# Patient Record
Sex: FEMALE | Race: WHITE | URBAN - METROPOLITAN AREA
[De-identification: names, ages, dates, MRNs, and addresses within clinical notes are randomized per-mention and may not be internally consistent; named-entity substitution may affect disease eponyms.]

---

## 2022-11-22 ENCOUNTER — OFFICE VISIT (OUTPATIENT)
Dept: URBAN - METROPOLITAN AREA CLINIC 44 | Facility: CLINIC | Age: 87
End: 2022-11-22
Payer: MEDICARE

## 2022-11-22 DIAGNOSIS — H25.813 COMBINED FORMS OF AGE-RELATED CATARACT, BILATERAL: ICD-10-CM

## 2022-11-22 DIAGNOSIS — H17.822 PERIPHERAL OPACITY OF CORNEA OF LEFT EYE: ICD-10-CM

## 2022-11-22 DIAGNOSIS — H35.3131 NONEXUDATIVE MACULAR DEGENERATION, EARLY DRY STAGE, BILATERAL: ICD-10-CM

## 2022-11-22 DIAGNOSIS — H04.123 TEAR FILM INSUFFICIENCY OF BILATERAL LACRIMAL GLANDS: Primary | ICD-10-CM

## 2022-11-22 DIAGNOSIS — H43.812 VITREOUS DEGENERATION, LEFT EYE: ICD-10-CM

## 2022-11-22 PROCEDURE — 99204 OFFICE O/P NEW MOD 45 MIN: CPT | Performed by: OPTOMETRIST

## 2022-11-22 PROCEDURE — 92134 CPTRZ OPH DX IMG PST SGM RTA: CPT | Performed by: OPTOMETRIST

## 2022-11-22 ASSESSMENT — INTRAOCULAR PRESSURE
OS: 16
OD: 16

## 2022-11-22 ASSESSMENT — VISUAL ACUITY
OS: 20/100
OD: 20/25

## 2022-11-22 ASSESSMENT — KERATOMETRY
OS: 45.88
OD: 45.88

## 2022-11-22 NOTE — IMPRESSION/PLAN
Impression: Nonexudative macular degeneration, early dry stage, bilateral: H35.3131. Plan: Mild drusen/RPE changes OU, no SRF OU Non-smoker Does not meet AREDS 2 criteria, but discussed lutein supplement RTC if notice changes in vision

## 2022-11-22 NOTE — IMPRESSION/PLAN
Impression: Combined forms of age-related cataract, bilateral: H25.813. Plan: Discussed cataracts with patient. Discussed treatment options. Surgical treatment is recommended. Surgical risks and benefits discussed. Patient elects surgical treatment. Recommend surgery OU, OS first. standard lens, LenSx, ORA. Aim OD: plano. Aim OS: plano. May need glasses for best vision after surgery.  
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

## 2022-11-22 NOTE — IMPRESSION/PLAN
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 Plan: Mild peripheral haze. May limit VA after cataract surgery.

## 2023-01-26 ENCOUNTER — ADULT PHYSICAL (OUTPATIENT)
Dept: URBAN - METROPOLITAN AREA CLINIC 44 | Facility: CLINIC | Age: 88
End: 2023-01-26
Payer: MEDICARE

## 2023-01-26 DIAGNOSIS — Z01.818 ENCOUNTER FOR OTHER PREPROCEDURAL EXAMINATION: Primary | ICD-10-CM

## 2023-01-26 DIAGNOSIS — H25.813 COMBINED FORMS OF AGE-RELATED CATARACT, BILATERAL: ICD-10-CM

## 2023-01-26 PROCEDURE — 99203 OFFICE O/P NEW LOW 30 MIN: CPT | Performed by: PHYSICIAN ASSISTANT

## 2023-01-26 PROCEDURE — 92025 CPTRIZED CORNEAL TOPOGRAPHY: CPT | Performed by: CLINIC/CENTER

## 2023-01-26 RX ORDER — ATENOLOL 100 MG/1
100 MG TABLET ORAL
Qty: 0 | Refills: 0 | Status: ACTIVE
Start: 2023-01-26

## 2023-01-26 ASSESSMENT — PACHYMETRY
OS: 3.93
OD: 22.86
OD: 3.54
OS: 23.56

## 2023-01-30 ENCOUNTER — PRE-OPERATIVE VISIT (OUTPATIENT)
Dept: URBAN - METROPOLITAN AREA CLINIC 10 | Facility: CLINIC | Age: 88
End: 2023-01-30
Payer: COMMERCIAL

## 2023-01-30 DIAGNOSIS — H25.813 COMBINED FORMS OF AGE-RELATED CATARACT, BILATERAL: Primary | ICD-10-CM

## 2023-01-30 DIAGNOSIS — H43.812 VITREOUS DEGENERATION, LEFT EYE: ICD-10-CM

## 2023-01-30 DIAGNOSIS — H35.3131 NONEXUDATIVE MACULAR DEGENERATION, EARLY DRY STAGE, BILATERAL: ICD-10-CM

## 2023-01-30 PROCEDURE — 99204 OFFICE O/P NEW MOD 45 MIN: CPT | Performed by: OPHTHALMOLOGY

## 2023-01-30 ASSESSMENT — PACHYMETRY
OD: 22.86
OS: 3.93
OD: 3.54
OS: 23.56

## 2023-01-30 NOTE — IMPRESSION/PLAN
Impression: Nonexudative macular degeneration, early dry stage, bilateral: H35.5765. Plan: Macular degeneration, dry type with stable vision. No fluid or heme noted. Will continue to monitor vision and the patient has been instructed to call with any vision changes. Amsler grid was given today. Advised patient it would be best to start AREDS. Patient is a non smoker. Advised patient to eat green leafy vegetables.

## 2023-02-08 ENCOUNTER — SURGERY (OUTPATIENT)
Dept: URBAN - METROPOLITAN AREA SURGERY 19 | Facility: SURGERY | Age: 88
End: 2023-02-08
Payer: COMMERCIAL

## 2023-02-08 DIAGNOSIS — H25.813 COMBINED FORMS OF AGE-RELATED CATARACT, BILATERAL: Primary | ICD-10-CM

## 2023-02-08 PROCEDURE — PR1CP PR1CP: CUSTOM | Performed by: OPHTHALMOLOGY

## 2023-02-08 PROCEDURE — 66984 XCAPSL CTRC RMVL W/O ECP: CPT | Performed by: OPHTHALMOLOGY

## 2023-02-09 ENCOUNTER — POST-OPERATIVE VISIT (OUTPATIENT)
Dept: URBAN - METROPOLITAN AREA CLINIC 44 | Facility: CLINIC | Age: 88
End: 2023-02-09
Payer: MEDICARE

## 2023-02-09 DIAGNOSIS — Z48.810 ENCOUNTER FOR SURGICAL AFTERCARE FOLLOWING SURGERY ON A SENSE ORGAN: Primary | ICD-10-CM

## 2023-02-09 PROCEDURE — 99024 POSTOP FOLLOW-UP VISIT: CPT | Performed by: OPTOMETRIST

## 2023-02-09 ASSESSMENT — INTRAOCULAR PRESSURE: OS: 16

## 2023-02-09 NOTE — IMPRESSION/PLAN
Impression: S/P Cataract Extraction by phacoemulsification with IOL placement; ORA OS - 1 Day. Encounter for surgical aftercare following surgery on a sense organ  Z48.980. Plan: Reviewed findings. Continue with prescribed medications as directed. RTC 1 week for Refraction + IOP check. RTC sooner if decreased vision or pain occurs.

## 2023-02-15 ENCOUNTER — POST-OPERATIVE VISIT (OUTPATIENT)
Dept: URBAN - METROPOLITAN AREA CLINIC 44 | Facility: CLINIC | Age: 88
End: 2023-02-15
Payer: MEDICARE

## 2023-02-15 DIAGNOSIS — Z48.810 ENCOUNTER FOR SURGICAL AFTERCARE FOLLOWING SURGERY ON A SENSE ORGAN: Primary | ICD-10-CM

## 2023-02-15 PROCEDURE — 99024 POSTOP FOLLOW-UP VISIT: CPT | Performed by: OPTOMETRIST

## 2023-02-15 ASSESSMENT — VISUAL ACUITY
OD: 20/25
OS: 20/40

## 2023-02-15 ASSESSMENT — INTRAOCULAR PRESSURE
OD: 15
OS: 12

## 2023-02-15 NOTE — IMPRESSION/PLAN
Impression: S/P Cataract Extraction by phacoemulsification with IOL placement; ORA OS - 7 Days. Encounter for surgical aftercare following surgery on a sense organ  Z48.810. Likely will need YAG OS Plan: Reviewed findings with patient. Continue with prescribed medications as directed. Patient is OK to proceed with CE/IOL for other eye. RTC as scheduled for CE/IOL 2nd eye and then 1 day S/P CE/IOL.

## 2023-02-22 ENCOUNTER — SURGERY (OUTPATIENT)
Dept: URBAN - METROPOLITAN AREA SURGERY 19 | Facility: SURGERY | Age: 88
End: 2023-02-22
Payer: MEDICARE

## 2023-02-22 DIAGNOSIS — H25.811 COMBINED FORMS OF AGE-RELATED CATARACT, RIGHT EYE: Primary | ICD-10-CM

## 2023-02-22 PROCEDURE — PR1CP PR1CP: CUSTOM | Performed by: OPHTHALMOLOGY

## 2023-02-22 PROCEDURE — 66984 XCAPSL CTRC RMVL W/O ECP: CPT | Performed by: OPHTHALMOLOGY

## 2023-02-23 ENCOUNTER — POST-OPERATIVE VISIT (OUTPATIENT)
Dept: URBAN - METROPOLITAN AREA CLINIC 44 | Facility: CLINIC | Age: 88
End: 2023-02-23
Payer: MEDICARE

## 2023-02-23 DIAGNOSIS — Z96.1 PRESENCE OF INTRAOCULAR LENS: Primary | ICD-10-CM

## 2023-02-23 PROCEDURE — 99024 POSTOP FOLLOW-UP VISIT: CPT | Performed by: OPTOMETRIST

## 2023-02-23 ASSESSMENT — INTRAOCULAR PRESSURE: OD: 18

## 2023-02-23 NOTE — IMPRESSION/PLAN
Impression: S/P Cataract Extraction by phacoemulsification with IOL placement; ORA OD - 1 Day. Presence of intraocular lens  Z96.1. Plan: Reviewed post-op progress. Continue with prescribed medications as directed. RTC 3-5 weeks for final post-op visit and refraction. RTC sooner if decreased in vision or pain experienced.

## 2024-12-11 ENCOUNTER — OFFICE VISIT (OUTPATIENT)
Dept: URBAN - METROPOLITAN AREA CLINIC 44 | Facility: CLINIC | Age: 89
End: 2024-12-11
Payer: MEDICARE

## 2024-12-11 DIAGNOSIS — H35.3131 BILATERAL NONEXUDATIVE AGE-RELATED MACULAR DEGENERATION, EARLY DRY STAGE: Primary | ICD-10-CM

## 2024-12-11 DIAGNOSIS — H53.002 UNSPECIFIED AMBLYOPIA, LEFT EYE: ICD-10-CM

## 2024-12-11 DIAGNOSIS — H26.493 OTHER SECONDARY CATARACT, BILATERAL: ICD-10-CM

## 2024-12-11 DIAGNOSIS — H52.4 PRESBYOPIA: ICD-10-CM

## 2024-12-11 DIAGNOSIS — H43.21 CRYSTALLINE DEPOSITS IN VITREOUS BODY, RIGHT EYE: ICD-10-CM

## 2024-12-11 PROCEDURE — 92014 COMPRE OPH EXAM EST PT 1/>: CPT | Performed by: OPTOMETRIST

## 2024-12-11 PROCEDURE — 92134 CPTRZ OPH DX IMG PST SGM RTA: CPT | Performed by: OPTOMETRIST

## 2024-12-11 ASSESSMENT — VISUAL ACUITY
OS: 20/60
OD: 20/30

## 2024-12-11 ASSESSMENT — KERATOMETRY
OS: 45.50
OD: 45.88

## 2024-12-11 ASSESSMENT — INTRAOCULAR PRESSURE
OS: 15
OD: 15